# Patient Record
(demographics unavailable — no encounter records)

---

## 2025-02-13 NOTE — DISCUSSION/SUMMARY
[FreeTextEntry1] : 38 yo P4, s/p nexplanon removal, desiring alternative contraception  -Nexplanon removed -Pt elected to used patches at this time -Rx for Twirla sent -advised condoms for 1 week and as backup -sterilization forms signed -pt to scheduled for laparoscpic BS. R/B/A discussed. Pt 100% certain she does not desire more children.   RTC for post-op check Needs annual and pap

## 2025-02-13 NOTE — HISTORY OF PRESENT ILLNESS
[FreeTextEntry1] : 38 yo P4, presents for Nexplanon removal. Pt had nexplanon placed in September. Reports since then she has felt bloated, cedeño and suffered from heaches and constipation. Also reports cyclic breast pain since nexplanon was placed.  She has not had her menses since July when she had an SAB. She desires to have it removed today. She would like alternative contraception. She is interested in permanent sterilization. She desires an alternative contraception as a bridge until that time.

## 2025-02-13 NOTE — HISTORY OF PRESENT ILLNESS
[FreeTextEntry1] : 36 yo P4, presents for Nexplanon removal. Pt had nexplanon placed in September. Reports since then she has felt bloated, cedeño and suffered from heaches and constipation. Also reports cyclic breast pain since nexplanon was placed.  She has not had her menses since July when she had an SAB. She desires to have it removed today. She would like alternative contraception. She is interested in permanent sterilization. She desires an alternative contraception as a bridge until that time.

## 2025-02-13 NOTE — DISCUSSION/SUMMARY
[FreeTextEntry1] : 36 yo P4, s/p nexplanon removal, desiring alternative contraception  -Nexplanon removed -Pt elected to used patches at this time -Rx for Twirla sent -advised condoms for 1 week and as backup -sterilization forms signed -pt to scheduled for laparoscpic BS. R/B/A discussed. Pt 100% certain she does not desire more children.   RTC for post-op check Needs annual and pap

## 2025-07-02 NOTE — PHYSICAL EXAM
[Appropriately responsive] : appropriately responsive [Alert] : alert [No Acute Distress] : no acute distress [Oriented x3] : oriented x3 [Labia Majora] : normal [Labia Minora] : normal [Discharge] : a  ~M vaginal discharge was present [White] : white [Thick] : thick [Normal] : normal [Uterine Adnexae] : normal

## 2025-07-02 NOTE — END OF VISIT
[] : Resident [Resident] : Resident [Time Spent: ___ minutes] : I have spent [unfilled] minutes of time on the encounter which excludes teaching and separately reported services.  used

## 2025-07-02 NOTE — DISCUSSION/SUMMARY
[FreeTextEntry1] : 37yo  with LMP of 25 and IUP visualized, here for confirmation of pregnancy, and suspected yeast infection.  #confirmation of pregnancy -f/u first trimester labs -f/u pap, HPV -panorama and official sonogram to be ordered at next visit -PO hydration encouraged -ambulation encouraged -EPL and bleeding precautions discussed -RTC in 2 weeks for new OB visit   #suspected yeast infection -f/u vaginitis -terconazole sent to pharmacy